# Patient Record
Sex: MALE | Race: WHITE | NOT HISPANIC OR LATINO | Employment: FULL TIME | URBAN - METROPOLITAN AREA
[De-identification: names, ages, dates, MRNs, and addresses within clinical notes are randomized per-mention and may not be internally consistent; named-entity substitution may affect disease eponyms.]

---

## 2019-06-01 ENCOUNTER — HOSPITAL ENCOUNTER (EMERGENCY)
Facility: HOSPITAL | Age: 59
Discharge: HOME | End: 2019-06-01
Attending: EMERGENCY MEDICINE
Payer: COMMERCIAL

## 2019-06-01 VITALS
SYSTOLIC BLOOD PRESSURE: 111 MMHG | HEIGHT: 74 IN | OXYGEN SATURATION: 94 % | DIASTOLIC BLOOD PRESSURE: 74 MMHG | TEMPERATURE: 98 F | HEART RATE: 73 BPM | RESPIRATION RATE: 25 BRPM | WEIGHT: 247 LBS | BODY MASS INDEX: 31.7 KG/M2

## 2019-06-01 DIAGNOSIS — R06.2 WHEEZING: ICD-10-CM

## 2019-06-01 DIAGNOSIS — R06.02 SOB (SHORTNESS OF BREATH): Primary | ICD-10-CM

## 2019-06-01 PROBLEM — J44.89 BRONCHITIS WITH AIRWAY OBSTRUCTION: Status: ACTIVE | Noted: 2019-06-01

## 2019-06-01 LAB
ALBUMIN SERPL BCP-MCNC: 3.5 G/DL (ref 3.5–5.2)
ALP SERPL-CCNC: 64 U/L (ref 55–135)
ALT SERPL W/O P-5'-P-CCNC: 34 U/L (ref 10–44)
ANION GAP SERPL CALC-SCNC: 9 MMOL/L (ref 8–16)
AST SERPL-CCNC: 22 U/L (ref 10–40)
BASOPHILS # BLD AUTO: 0.01 K/UL (ref 0–0.2)
BASOPHILS NFR BLD: 0.2 % (ref 0–1.9)
BILIRUB SERPL-MCNC: 0.6 MG/DL (ref 0.1–1)
BNP SERPL-MCNC: 46 PG/ML (ref 0–99)
BUN SERPL-MCNC: 12 MG/DL (ref 6–20)
CALCIUM SERPL-MCNC: 9.5 MG/DL (ref 8.7–10.5)
CHLORIDE SERPL-SCNC: 109 MMOL/L (ref 95–110)
CO2 SERPL-SCNC: 22 MMOL/L (ref 23–29)
CREAT SERPL-MCNC: 0.8 MG/DL (ref 0.5–1.4)
DIFFERENTIAL METHOD: ABNORMAL
EOSINOPHIL # BLD AUTO: 0.1 K/UL (ref 0–0.5)
EOSINOPHIL NFR BLD: 1.3 % (ref 0–8)
ERYTHROCYTE [DISTWIDTH] IN BLOOD BY AUTOMATED COUNT: 12.1 % (ref 11.5–14.5)
EST. GFR  (AFRICAN AMERICAN): >60 ML/MIN/1.73 M^2
EST. GFR  (NON AFRICAN AMERICAN): >60 ML/MIN/1.73 M^2
GLUCOSE SERPL-MCNC: 96 MG/DL (ref 70–110)
HCT VFR BLD AUTO: 40.9 % (ref 40–54)
HGB BLD-MCNC: 14.3 G/DL (ref 14–18)
LYMPHOCYTES # BLD AUTO: 1.7 K/UL (ref 1–4.8)
LYMPHOCYTES NFR BLD: 26.2 % (ref 18–48)
MCH RBC QN AUTO: 32.5 PG (ref 27–31)
MCHC RBC AUTO-ENTMCNC: 35 G/DL (ref 32–36)
MCV RBC AUTO: 93 FL (ref 82–98)
MONOCYTES # BLD AUTO: 0.4 K/UL (ref 0.3–1)
MONOCYTES NFR BLD: 6.8 % (ref 4–15)
NEUTROPHILS # BLD AUTO: 4.2 K/UL (ref 1.8–7.7)
NEUTROPHILS NFR BLD: 65.5 % (ref 38–73)
PLATELET # BLD AUTO: 186 K/UL (ref 150–350)
PMV BLD AUTO: 9 FL (ref 9.2–12.9)
POTASSIUM SERPL-SCNC: 3.8 MMOL/L (ref 3.5–5.1)
PROT SERPL-MCNC: 6.4 G/DL (ref 6–8.4)
RBC # BLD AUTO: 4.4 M/UL (ref 4.6–6.2)
SODIUM SERPL-SCNC: 140 MMOL/L (ref 136–145)
WBC # BLD AUTO: 6.33 K/UL (ref 3.9–12.7)

## 2019-06-01 PROCEDURE — 96374 THER/PROPH/DIAG INJ IV PUSH: CPT

## 2019-06-01 PROCEDURE — 80053 COMPREHEN METABOLIC PANEL: CPT

## 2019-06-01 PROCEDURE — 94640 AIRWAY INHALATION TREATMENT: CPT

## 2019-06-01 PROCEDURE — 94664 DEMO&/EVAL PT USE INHALER: CPT

## 2019-06-01 PROCEDURE — 63600175 PHARM REV CODE 636 W HCPCS: Performed by: EMERGENCY MEDICINE

## 2019-06-01 PROCEDURE — 99284 EMERGENCY DEPT VISIT MOD MDM: CPT

## 2019-06-01 PROCEDURE — 99900035 HC TECH TIME PER 15 MIN (STAT)

## 2019-06-01 PROCEDURE — 85025 COMPLETE CBC W/AUTO DIFF WBC: CPT

## 2019-06-01 PROCEDURE — 83880 ASSAY OF NATRIURETIC PEPTIDE: CPT

## 2019-06-01 PROCEDURE — 25000242 PHARM REV CODE 250 ALT 637 W/ HCPCS: Performed by: EMERGENCY MEDICINE

## 2019-06-01 PROCEDURE — 27000646 HC AEROBIKA DEVICE

## 2019-06-01 RX ORDER — IPRATROPIUM BROMIDE 0.5 MG/2.5ML
500 SOLUTION RESPIRATORY (INHALATION) 4 TIMES DAILY
COMMUNITY

## 2019-06-01 RX ORDER — IPRATROPIUM BROMIDE AND ALBUTEROL SULFATE 2.5; .5 MG/3ML; MG/3ML
3 SOLUTION RESPIRATORY (INHALATION)
Status: COMPLETED | OUTPATIENT
Start: 2019-06-01 | End: 2019-06-01

## 2019-06-01 RX ORDER — BUDESONIDE AND FORMOTEROL FUMARATE DIHYDRATE 80; 4.5 UG/1; UG/1
2 AEROSOL RESPIRATORY (INHALATION) 2 TIMES DAILY
Qty: 1 INHALER | Refills: 3 | OUTPATIENT
Start: 2019-06-01 | End: 2020-05-31

## 2019-06-01 RX ORDER — METHYLPREDNISOLONE SOD SUCC 125 MG
125 VIAL (EA) INJECTION
Status: COMPLETED | OUTPATIENT
Start: 2019-06-01 | End: 2019-06-01

## 2019-06-01 RX ORDER — FLUTICASONE PROPIONATE 110 UG/1
1 AEROSOL, METERED RESPIRATORY (INHALATION) 2 TIMES DAILY
COMMUNITY

## 2019-06-01 RX ORDER — ALBUTEROL SULFATE 90 UG/1
2 AEROSOL, METERED RESPIRATORY (INHALATION) EVERY 6 HOURS PRN
Qty: 18 G | Refills: 0 | OUTPATIENT
Start: 2019-06-01 | End: 2020-05-31

## 2019-06-01 RX ORDER — TIOTROPIUM BROMIDE 18 UG/1
18 CAPSULE ORAL; RESPIRATORY (INHALATION) DAILY
COMMUNITY

## 2019-06-01 RX ORDER — FLUTICASONE PROPIONATE 50 MCG
1 SPRAY, SUSPENSION (ML) NASAL DAILY
COMMUNITY

## 2019-06-01 RX ORDER — ALBUTEROL SULFATE 90 UG/1
1-2 AEROSOL, METERED RESPIRATORY (INHALATION) EVERY 6 HOURS PRN
Qty: 1 INHALER | Refills: 0 | Status: SHIPPED | OUTPATIENT
Start: 2019-06-01 | End: 2021-02-05

## 2019-06-01 RX ADMIN — METHYLPREDNISOLONE SODIUM SUCCINATE 125 MG: 125 INJECTION, POWDER, FOR SOLUTION INTRAMUSCULAR; INTRAVENOUS at 11:06

## 2019-06-01 RX ADMIN — IPRATROPIUM BROMIDE AND ALBUTEROL SULFATE 3 ML: .5; 3 SOLUTION RESPIRATORY (INHALATION) at 11:06

## 2019-06-01 NOTE — HPI
Mr. Mcleod is a 59yo WM w/ no pmhx recently evaluated in [facility] for cough that has now been present for approx 2.5 months. He was evaluated twice over the last month. Initially he had complained of viral URI symptoms prior to developing the cough. He has no hx of asthma as a child. He notes never smoking. He has been taking his inhalers as directed. He was tested for pertussis but results are c/w prior infection or immunization. This morning however he became progressively more dyspneic. He called his pulmonologist and was instructed to present to the ED. He notes mild chest pain and soreness. Denies hemoptysis.

## 2019-06-01 NOTE — SUBJECTIVE & OBJECTIVE
History reviewed. No pertinent past medical history.    Past Surgical History:   Procedure Laterality Date    CERVICAL FUSION      left leg surgery         Review of patient's allergies indicates:  No Known Allergies    Family History     None        Tobacco Use    Smoking status: Never Smoker    Smokeless tobacco: Former User   Substance and Sexual Activity    Alcohol use: Yes     Comment: occasional    Drug use: Never    Sexual activity: Not on file         Review of Systems   All other systems reviewed and are negative.    Objective:     Vital Signs (Most Recent):  Temp: 98 °F (36.7 °C) (06/01/19 1305)  Pulse: 73 (06/01/19 1259)  Resp: (!) 25 (06/01/19 1259)  BP: 111/74 (06/01/19 1305)  SpO2: (!) 94 % (06/01/19 1259) Vital Signs (24h Range):  Temp:  [97.1 °F (36.2 °C)-98 °F (36.7 °C)] 98 °F (36.7 °C)  Pulse:  [61-73] 73  Resp:  [18-25] 25  SpO2:  [92 %-99 %] 94 %  BP: (111-131)/(60-78) 111/74     Weight: 112 kg (247 lb)  Body mass index is 31.71 kg/m².    No intake or output data in the 24 hours ending 06/01/19 1519    Physical Exam   Constitutional: He is oriented to person, place, and time. He appears well-developed and well-nourished.   HENT:   Head: Normocephalic and atraumatic.   Eyes: Pupils are equal, round, and reactive to light. EOM are normal.   Cardiovascular: Normal rate, regular rhythm and normal heart sounds.   Pulmonary/Chest: Effort normal. He has no rales.   Harsh, expiratory wheezes   Abdominal: Soft. Bowel sounds are normal.   Neurological: He is alert and oriented to person, place, and time.   Skin: Skin is warm and dry. No erythema.       Vents:       Lines/Drains/Airways          None          Significant Labs:    CBC/Anemia Profile:  Recent Labs   Lab 06/01/19  1103   WBC 6.33   HGB 14.3   HCT 40.9      MCV 93   RDW 12.1        Chemistries:  Recent Labs   Lab 06/01/19  1103      K 3.8      CO2 22*   BUN 12   CREATININE 0.8   CALCIUM 9.5   ALBUMIN 3.5   PROT 6.4    BILITOT 0.6   ALKPHOS 64   ALT 34   AST 22       All pertinent labs within the past 24 hours have been reviewed.    Significant Imaging:   CT w/ linear consolidations concerning for mucous plugging

## 2019-06-01 NOTE — ASSESSMENT & PLAN NOTE
-recent w/u for cough over the last 2.5 months; prior viral URI; now with worsening cough and dyspnea; instructed to present to the ED by primary pulmonologist  -CT w/ linear consolidations concerning for mucous plugging  -This likely represents some mucous plugging from recent acute bronchitis. He will be given an acapella valve and instructed on CPT. He will continue his LABA/ICS and MERY and short acting antimuscarinic. If he continues to not improve, he would likely need a bronch w/ BAL.

## 2019-06-01 NOTE — CONSULTS
[facility]  Pulmonology  Consult Note    Patient Name: Mauricio Mcleod  MRN: [id number]  Admission Date: 6/1/2019  Hospital Length of Stay: 0 days  Code Status: No Order  Attending Physician: No att. providers found  Primary Care Provider: Primary Doctor No   Principal Problem: <principal problem not specified>    Consults  Subjective:     HPI:  Mr. Mcleod is a 59yo WM w/ no pmhx recently evaluated in [facility] for cough that has now been present for approx 2.5 months. He was evaluated twice over the last month. Initially he had complained of viral URI symptoms prior to developing the cough. He has no hx of asthma as a child. He notes never smoking. He has been taking his inhalers as directed. He was tested for pertussis but results are c/w prior infection or immunization. This morning however he became progressively more dyspneic. He called his pulmonologist and was instructed to present to the ED. He notes mild chest pain and soreness. Denies hemoptysis.     History reviewed. No pertinent past medical history.    Past Surgical History:   Procedure Laterality Date    CERVICAL FUSION      left leg surgery         Review of patient's allergies indicates:  No Known Allergies    Family History     None        Tobacco Use    Smoking status: Never Smoker    Smokeless tobacco: Former User   Substance and Sexual Activity    Alcohol use: Yes     Comment: occasional    Drug use: Never    Sexual activity: Not on file         Review of Systems   All other systems reviewed and are negative.    Objective:     Vital Signs (Most Recent):  Temp: 98 °F (36.7 °C) (06/01/19 1305)  Pulse: 73 (06/01/19 1259)  Resp: (!) 25 (06/01/19 1259)  BP: 111/74 (06/01/19 1305)  SpO2: (!) 94 % (06/01/19 1259) Vital Signs (24h Range):  Temp:  [97.1 °F (36.2 °C)-98 °F (36.7 °C)] 98 °F (36.7 °C)  Pulse:  [61-73] 73  Resp:  [18-25] 25  SpO2:  [92 %-99 %] 94 %  BP: (111-131)/(60-78) 111/74     Weight: 112 kg (247 lb)  Body mass  index is 31.71 kg/m².    No intake or output data in the 24 hours ending 06/01/19 1519    Physical Exam   Constitutional: He is oriented to person, place, and time. He appears well-developed and well-nourished.   HENT:   Head: Normocephalic and atraumatic.   Eyes: Pupils are equal, round, and reactive to light. EOM are normal.   Cardiovascular: Normal rate, regular rhythm and normal heart sounds.   Pulmonary/Chest: Effort normal. He has no rales.   Harsh, expiratory wheezes   Abdominal: Soft. Bowel sounds are normal.   Neurological: He is alert and oriented to person, place, and time.   Skin: Skin is warm and dry. No erythema.       Vents:       Lines/Drains/Airways          None          Significant Labs:    CBC/Anemia Profile:  Recent Labs   Lab 06/01/19  1103   WBC 6.33   HGB 14.3   HCT 40.9      MCV 93   RDW 12.1        Chemistries:  Recent Labs   Lab 06/01/19  1103      K 3.8      CO2 22*   BUN 12   CREATININE 0.8   CALCIUM 9.5   ALBUMIN 3.5   PROT 6.4   BILITOT 0.6   ALKPHOS 64   ALT 34   AST 22       All pertinent labs within the past 24 hours have been reviewed.    Significant Imaging:   CT w/ linear consolidations concerning for mucous plugging    Assessment/Plan:     Bronchitis with airway obstruction  -recent w/u for cough over the last 2.5 months; prior viral URI; now with worsening cough and dyspnea; instructed to present to the ED by primary pulmonologist  -CT w/ linear consolidations concerning for mucous plugging  -This likely represents some mucous plugging from recent acute bronchitis. He will be given an acapella valve and instructed on CPT. He will continue his LABA/ICS and MERY and short acting antimuscarinic. If he continues to not improve, he would likely need a bronch w/ BAL.               Thank you for your consult. I will sign off. Please contact us if you have any additional questions.     David Hernandez MD  Pulmonology  [facility]

## 2019-06-01 NOTE — ED PROVIDER NOTES
Encounter Date: 6/1/2019    SCRIBE #1 NOTE: I, Nadya Caceresz, am scribing for, and in the presence of,  Dr. Lawson. I have scribed the entire note.     I, Dr. Grecia Lawson MD, personally performed the services described in this documentation. All medical record entries made by the scribe were at my direction and in my presence.  I have reviewed the chart and agree that the record reflects my personal performance and is accurate and complete. Grecia Lawson MD.    History     Chief Complaint   Patient presents with    Shortness of Breath     Pt saw Pulmonologist last Friday for cough, put on Spiriva, Flovent, Atrovent and Flonase. Today pt having shortness of breath and coughing up green phlegm. Afebrile.      CHIEF COMPLAINT: Patient presents with: productive cough      HISTORY OF PRESENT ILLNESS: Mauricio Mcleod who is a 58 y.o. presents to the emergency department today with complaint of a productive cough that began 3 months ago. Initially, his cough produce white, pearly sputum, and it has now progressed to green sputum. Associated symptoms include SOB and CP when he coughs. He denies any recent long distance travel, leg swelling, hemoptysis, fever, chills, or sweats. He saw a pulmonologist at [facility] .      ALLERGIES REVIEWED  MEDICATIONS REVIEWED  PMH/PSH/SOC/FH REVIEWED     The history is provided by the patient.    Nursing/Ancillary staff note reviewed.    The history is provided by the patient.     Review of patient's allergies indicates:  No Known Allergies  History reviewed. No pertinent past medical history.  Past Surgical History:   Procedure Laterality Date    CERVICAL FUSION      left leg surgery       History reviewed. No pertinent family history.  Social History     Tobacco Use    Smoking status: Never Smoker    Smokeless tobacco: Former User   Substance Use Topics    Alcohol use: Yes     Comment: occasional    Drug use: Never     Review of Systems   Constitutional: Negative for activity change,  chills, diaphoresis and fever.   HENT: Negative for congestion, drooling, ear pain, rhinorrhea, sneezing, sore throat and trouble swallowing.    Eyes: Negative for pain.   Respiratory: Positive for cough and shortness of breath. Negative for chest tightness, wheezing and stridor.    Cardiovascular: Positive for chest pain. Negative for palpitations and leg swelling.        CP with cough   Gastrointestinal: Negative for abdominal distention, abdominal pain, constipation, diarrhea, nausea and vomiting.   Genitourinary: Negative for difficulty urinating, dysuria, frequency and urgency.   Musculoskeletal: Negative for arthralgias, back pain, myalgias, neck pain and neck stiffness.   Skin: Negative for pallor, rash and wound.   Neurological: Negative for dizziness, syncope, weakness, light-headedness, numbness and headaches.   All other systems reviewed and are negative.      Physical Exam     Initial Vitals [06/01/19 1031]   BP Pulse Resp Temp SpO2   131/78 62 (!) 22 97.1 °F (36.2 °C) 95 %      MAP       --         Physical Exam    Nursing note and vitals reviewed.  Constitutional: He appears well-developed and well-nourished. He is not diaphoretic. No distress.   HENT:   Head: Normocephalic and atraumatic.   Nose: Nose normal.   Mouth/Throat: Oropharynx is clear and moist.   Eyes: Conjunctivae and EOM are normal. Pupils are equal, round, and reactive to light. No scleral icterus.   Neck: Normal range of motion. Neck supple. No JVD present.   Cardiovascular: Normal rate, regular rhythm and normal heart sounds. Exam reveals no gallop and no friction rub.    No murmur heard.  Pulmonary/Chest: No stridor. No respiratory distress. He has no wheezes. He exhibits no tenderness.   Significant wheezing throughout all lung zones   Abdominal: Soft. Bowel sounds are normal. He exhibits no distension and no mass. There is no tenderness. There is no rebound and no guarding.   Musculoskeletal: Normal range of motion. He exhibits no  edema or tenderness.        Cervical back: Normal.        Thoracic back: Normal.        Lumbar back: Normal.   Lymphadenopathy:     He has no cervical adenopathy.   Neurological: He is alert and oriented to person, place, and time. He has normal strength. No cranial nerve deficit.   Skin: Skin is warm and dry. No rash noted. No pallor.   Psychiatric: He has a normal mood and affect. Thought content normal.         ED Course   Procedures  Labs Reviewed   CBC W/ AUTO DIFFERENTIAL - Abnormal; Notable for the following components:       Result Value    RBC 4.40 (*)     Mean Corpuscular Hemoglobin 32.5 (*)     MPV 9.0 (*)     All other components within normal limits   COMPREHENSIVE METABOLIC PANEL - Abnormal; Notable for the following components:    CO2 22 (*)     All other components within normal limits   B-TYPE NATRIURETIC PEPTIDE            X-Rays:   Independently Interpreted Readings:   Other Readings:  Reviewed by myself, read by radiology.       Imaging Results          CT Chest Without Contrast (Final result)  Result time 06/01/19 12:09:38    Final result by Tommie Pollard MD (06/01/19 12:09:38)                 Impression:      Small amount of opacity in the bronchi to the right lower and right middle lobes with a small amount of adjacent opacity.  Findings may be seen with aspiration or bronchitis.    Mild diffuse form dilatation of the ascending thoracic aorta.      Electronically signed by: Tommie Pollard MD  Date:    06/01/2019  Time:    12:09             Narrative:    EXAMINATION:  CT CHEST WITHOUT CONTRAST    CLINICAL HISTORY:  Shortness of breath;    TECHNIQUE:  Low dose axial images, sagittal and coronal reformations were obtained from the thoracic inlet to the lung bases. Contrast was not administered.    COMPARISON:  None.    FINDINGS:  Normal thyroid.  Thoracic aorta demonstrates mild dilatation of the ascending thoracic aorta measuring 4.2 cm.  No cardiomegaly.  Mild calcific coronary artery  atherosclerosis.  No pericardial effusion.  Mild bilateral gynecomastia.    No evidence of mediastinal, hilar, or axillary lymphadenopathy.    Trachea is patent.  There is a small amount of secretions in opacities in the bronchitis to the right lower lobe which may be seen with aspiration or bronchitis.  There is a small amount of adjacent opacity in the right lower and right middle lobes.  Remaining portion of the lungs are clear.    Limited evaluation of the upper abdomen demonstrates subcentimeter hypodensity in the right hepatic lobe, too small to characterize but likely representing a cyst.    Osseous structures demonstrate no suspicious osseous lesions.  Postsurgical changes of the cervical spine.                                Medical Decision Making:   History:   Old Medical Records: I decided to obtain old medical records.  Initial Assessment:   Patient presents with a productive cough and associated SOB. Will obtain CT of chest.  Differential Diagnosis:   Pulmonary infectious process, COPD, asthma, pulmonary embolus and congestive heart failure.    Clinical Tests:   Radiological Study: Ordered and Reviewed  ED Management:  The pt presents to the ED today with cough and sputum production that has been ongoing. His CT scan shows likely mucus plugging. Pulmonology has been to bedside and have discussed the results and treatment plan with the pt. No need for admission at this time. I have discussed with him the results of the CT scan that will need followup - thoracic aorta has some dilation which will need to be monitored by his PCP. He understands. He will follow up with his PCP and pulmonology. After taking into careful account the historical factors and physical exam findings of the patient's presentation today, in conjunction with the empirical and objective data obtained on ED workup, no acute emergent medical condition requiring admission has been identified. The patient appears to be low risk for an  emergent medical condition and I feel it is safe and appropriate at this time for the patient to be discharged to follow-up as detailed in their discharge instructions for reevaluation and possible continued outpatient workup and management. I have discussed the specifics of the workup with the patient and the patient has verbalized understanding of the details of the workup, the diagnosis, the treatment plan, and the need for outpatient follow-up.  Although the patient has no emergent etiology today this does not preclude the development of an emergent condition so in addition, I have advised the patient that they can return to the ED and/or activate EMS at any time with worsening of their symptoms, change of their symptoms, or with any other medical complaint.  The patient remained comfortable and stable during their visit in the ED.  Discharge and follow-up instructions discussed with the patient who expressed understanding and willingness to comply with my recommendations.     This medical record was prepared using voice dictation software. There may be phonetic errors.                       ED Course as of Jun 02 0717   Sat Jun 01, 2019   1239 Dr. Leyva has been to bedside and discussed results of the workup with the pt and treatment plan. At this time Dr. Leyva advised that he is suitable for discharge and follow up in clinic. We have discussed the Thoracic aorta demonstrates mild dilatation of the ascending thoracic aorta measuring 4.2 cm findings on the CT scan and he will follow up with PCP for monitoring.       [SG]      ED Course User Index  [SG] Nadya Orona     Clinical Impression:       ICD-10-CM ICD-9-CM   1. SOB (shortness of breath) R06.02 786.05   2. Wheezing R06.2 786.07                                Grecia Lawson MD  06/02/19 0765

## 2019-06-01 NOTE — DISCHARGE INSTRUCTIONS
Additional instructions  Followup with your pulmonologist as scheduled. Take all your medications as prescribed. Return to the emergency department if you have increasing pain, chest pain, difficulty breathing,  nonstop vomiting, or any other concerns. Be sure to drink plenty of fluids to stay hydrated. Get plenty of rest. Please refer to additional educational material for further instructions.

## 2019-06-05 DIAGNOSIS — R91.8 ABNORMAL CT SCAN OF LUNG: Primary | ICD-10-CM

## 2019-06-07 ENCOUNTER — TELEPHONE (OUTPATIENT)
Dept: PULMONOLOGY | Facility: HOSPITAL | Age: 59
End: 2019-06-07

## 2019-06-07 NOTE — TELEPHONE ENCOUNTER
Spoke with Mr. Mauricio Mcleod over the telephone, in regards to his upcoming bronchosocpy this Monday, 6/10/19.  I instructed him to report to the pre bronchoscopy registration area at 7 am and to eat nothing after dinner this Sunday.    Florentino Fry MD, MSc  Pulmonary/Critical Care Fellow

## 2019-06-10 ENCOUNTER — HOSPITAL ENCOUNTER (OUTPATIENT)
Dept: PULMONOLOGY | Facility: HOSPITAL | Age: 59
Discharge: HOME | End: 2019-06-10
Attending: INTERNAL MEDICINE
Payer: COMMERCIAL

## 2019-06-10 ENCOUNTER — HOSPITAL ENCOUNTER (OUTPATIENT)
Facility: HOSPITAL | Age: 59
Discharge: HOME | End: 2019-06-10
Attending: INTERNAL MEDICINE | Admitting: INTERNAL MEDICINE
Payer: COMMERCIAL

## 2019-06-10 VITALS
WEIGHT: 248 LBS | HEIGHT: 74 IN | RESPIRATION RATE: 18 BRPM | SYSTOLIC BLOOD PRESSURE: 119 MMHG | OXYGEN SATURATION: 94 % | DIASTOLIC BLOOD PRESSURE: 79 MMHG | HEART RATE: 72 BPM | TEMPERATURE: 98 F | BODY MASS INDEX: 31.83 KG/M2

## 2019-06-10 DIAGNOSIS — R05.3 CHRONIC COUGH: ICD-10-CM

## 2019-06-10 DIAGNOSIS — R91.8 ABNORMAL CT SCAN OF LUNG: ICD-10-CM

## 2019-06-10 DIAGNOSIS — R93.89 ABNORMAL CT SCAN, CHEST: ICD-10-CM

## 2019-06-10 LAB
ACID FAST MOD KINY STN SPEC: NORMAL
APPEARANCE FLD: CLEAR
BASOPHILS NFR FLD MANUAL: 0 %
BODY FLD TYPE: NORMAL
BODY FLUID COMMENTS: NORMAL
COLOR FLD: COLORLESS
EOSINOPHIL NFR FLD MANUAL: 0 %
KOH PREP SPEC: NORMAL
LYMPHOCYTES NFR FLD MANUAL: 41 %
MESOTHL CELL NFR FLD MANUAL: 0 %
MONOS+MACROS NFR FLD MANUAL: 37 %
NEUTROPHILS NFR FLD MANUAL: 22 %
OTHER CELLS FLD MANUAL: 0 %
WBC # FLD: 53 /CU MM

## 2019-06-10 PROCEDURE — 88112 CYTOLOGY SPECIMEN- MEDICAL CYTOLOGY (FLUID/WASH/BRUSH): ICD-10-PCS | Mod: 26,,, | Performed by: PATHOLOGY

## 2019-06-10 PROCEDURE — 88305 TISSUE EXAM BY PATHOLOGIST: CPT | Performed by: PATHOLOGY

## 2019-06-10 PROCEDURE — 31622 DX BRONCHOSCOPE/WASH: CPT

## 2019-06-10 PROCEDURE — 63600175 PHARM REV CODE 636 W HCPCS: Performed by: STUDENT IN AN ORGANIZED HEALTH CARE EDUCATION/TRAINING PROGRAM

## 2019-06-10 PROCEDURE — 87205 SMEAR GRAM STAIN: CPT

## 2019-06-10 PROCEDURE — 31625 BRONCHOSCOPY W/BIOPSY(S): CPT

## 2019-06-10 PROCEDURE — 87070 CULTURE OTHR SPECIMN AEROBIC: CPT

## 2019-06-10 PROCEDURE — 87210 SMEAR WET MOUNT SALINE/INK: CPT

## 2019-06-10 PROCEDURE — 88305 TISSUE EXAM BY PATHOLOGIST: CPT | Mod: 26,,, | Performed by: PATHOLOGY

## 2019-06-10 PROCEDURE — 88112 CYTOPATH CELL ENHANCE TECH: CPT | Mod: 26,,, | Performed by: PATHOLOGY

## 2019-06-10 PROCEDURE — 25000003 PHARM REV CODE 250: Performed by: STUDENT IN AN ORGANIZED HEALTH CARE EDUCATION/TRAINING PROGRAM

## 2019-06-10 PROCEDURE — 88305 TISSUE SPECIMEN TO PATHOLOGY: ICD-10-PCS | Mod: 26,,, | Performed by: PATHOLOGY

## 2019-06-10 PROCEDURE — 31624 DX BRONCHOSCOPE/LAVAGE: CPT

## 2019-06-10 PROCEDURE — 87102 FUNGUS ISOLATION CULTURE: CPT

## 2019-06-10 PROCEDURE — 87632 RESP VIRUS 6-11 TARGETS: CPT

## 2019-06-10 PROCEDURE — 87206 SMEAR FLUORESCENT/ACID STAI: CPT

## 2019-06-10 PROCEDURE — 87206 SMEAR FLUORESCENT/ACID STAI: CPT | Mod: 91

## 2019-06-10 PROCEDURE — 89051 BODY FLUID CELL COUNT: CPT

## 2019-06-10 PROCEDURE — 31628 BRONCHOSCOPY/LUNG BX EACH: CPT

## 2019-06-10 PROCEDURE — 87116 MYCOBACTERIA CULTURE: CPT

## 2019-06-10 PROCEDURE — 87015 SPECIMEN INFECT AGNT CONCNTJ: CPT

## 2019-06-10 PROCEDURE — 88112 CYTOPATH CELL ENHANCE TECH: CPT | Performed by: PATHOLOGY

## 2019-06-10 RX ORDER — FENTANYL CITRATE 50 UG/ML
50 INJECTION, SOLUTION INTRAMUSCULAR; INTRAVENOUS ONCE
Status: DISCONTINUED | OUTPATIENT
Start: 2019-06-10 | End: 2019-06-10 | Stop reason: HOSPADM

## 2019-06-10 RX ORDER — LIDOCAINE HYDROCHLORIDE 10 MG/ML
20 INJECTION INFILTRATION; PERINEURAL ONCE
Status: COMPLETED | OUTPATIENT
Start: 2019-06-10 | End: 2019-06-10

## 2019-06-10 RX ORDER — MIDAZOLAM HYDROCHLORIDE 5 MG/ML
4 INJECTION INTRAMUSCULAR; INTRAVENOUS ONCE
Status: COMPLETED | OUTPATIENT
Start: 2019-06-10 | End: 2019-06-10

## 2019-06-10 RX ORDER — FENTANYL CITRATE 50 UG/ML
50 INJECTION, SOLUTION INTRAMUSCULAR; INTRAVENOUS ONCE
Status: COMPLETED | OUTPATIENT
Start: 2019-06-10 | End: 2019-06-10

## 2019-06-10 RX ORDER — LIDOCAINE HYDROCHLORIDE 20 MG/ML
10 SOLUTION OROPHARYNGEAL ONCE
Status: COMPLETED | OUTPATIENT
Start: 2019-06-10 | End: 2019-06-10

## 2019-06-10 RX ORDER — LIDOCAINE HYDROCHLORIDE 20 MG/ML
10 INJECTION, SOLUTION EPIDURAL; INFILTRATION; INTRACAUDAL; PERINEURAL ONCE
Status: DISCONTINUED | OUTPATIENT
Start: 2019-06-10 | End: 2019-06-10 | Stop reason: HOSPADM

## 2019-06-10 RX ADMIN — LIDOCAINE HYDROCHLORIDE 20 ML: 10 INJECTION, SOLUTION INFILTRATION; PERINEURAL at 09:06

## 2019-06-10 RX ADMIN — MIDAZOLAM HYDROCHLORIDE 4 MG: 5 INJECTION, SOLUTION INTRAMUSCULAR; INTRAVENOUS at 09:06

## 2019-06-10 RX ADMIN — LIDOCAINE HYDROCHLORIDE 10 ML: 20 SOLUTION ORAL; TOPICAL at 09:06

## 2019-06-10 RX ADMIN — FENTANYL CITRATE 50 MCG: 50 INJECTION, SOLUTION INTRAMUSCULAR; INTRAVENOUS at 08:06

## 2019-06-10 NOTE — HPI
59 y/o male (never smoker) here for an outpatient bronchoscopy for a chronic cough (5 months) and abnormal chest imaging (patient of Dr. Jonathan Villafana at [facility]).  He has been NPO since midnight and is not on any antiplatelets/anticoagulation.    PSx: ACDF    Allergies: NKDA    ROS: cough    Social History: social drinker, never smoker.  No illicit drug use    Physical Examination:    Gen: AAOx3, NAD  Head: NC/AT  Eyes: PERRLA, EOMI  Ears: no discharge  Nose: no nasal polyps  Throat: no tonsillar exudate  Neck: no masses  Lungs: CTAB, no wheezing, rhonci or rales  Cardiac: S1/S2=normal, no murmurs, rubs or gallops  Abdomen: soft, nontender, nondistended, BS+  Extremities: no edema  Skin: no rashes    ASA: 2 Mallampati: 2    Florentino Fry MD, MSc  Pulmonary/Critical Care Fellow

## 2019-06-10 NOTE — PROCEDURES
Bronchoscopy Procedure Note      Date of Operation: 6/10/19    Operators: Florentino Fry MD  (fellow) & Erickson Magana  (staff)    Anesthesia:   Versed 4mg (in divided doses)  Fentanyl 50mcg (in divided doses)  2% lidocaine jelly  1% lidocaine (infiltrated onto cords and into airways during procedure)    Operation: Flexible fiberoptic bronchoscopy:bronchial wash/endobronchial mucosa biopsies    Specimen: BAL of RML and RLL (superior segment) combined: total of 50 cc returned and sent for further analysis (180 cc instilled)     Estimated Blood Loss: Minimal    Consent:   The risks, benefits, complications, treatment options and expected outcomes were discussed with the patient.  The possibilities of reaction to medication, pulmonary aspiration, pneumothorax, bleeding, failure to diagnose her condition, and creating a complication requiring transfusion or operation were discussed with the patient who freely signed the consent.      Description of Procedure:    After the induction of topical nasopharyngeal anesthesia, the patient was positioned supine, and the bronchoscope was passed through the left nare without difficulty. The vocal cords were visualized and  2% buffered lidocaine 2 ml was topically placed onto the cords. The cords were unremarkable in appearance with normal motion. The scope was then passed into the trachea.  1% buffered lidocaine 1 ml was used topically on the ermias.  Careful inspection of the tracheal lumen was accomplished. The scope was sequentially passed into the right main bronchus and then into the RUL, RML, and RLL bronchi and segmental bronchi.  Overall mucosa appeared erythematous without any active bleeding or evidence of endobronchial lesions; however secretions evident in the RLL.  BAL of RML and RLL (superior segement) performed.  Endobronchial mucosa biopsies obtained at the ermias and secondary ermias/junction of the RML.  The scope was then subsequently passed into the  left main bronchus, MARCELLO, and LLL bronchi and segmental bronchi: no evidence of secretions, endobronchial lesions, secretions or bleeding.    The scope was then withdrawn, and the patient was taken to the recovery area in satisfactory condition.    Florentino Fry MD, MSc  Pulmonary/Critical Care Fellow    Pt seen and examined with Pulmonary/Critical Care team and this note reviewed and validated with the following additional comments:    Supervised by me.  Mucosa bronchitic appearing. Tolerated well.    Jonathan Magana MD  Phone [phone]

## 2019-06-10 NOTE — SUBJECTIVE & OBJECTIVE
History reviewed. No pertinent past medical history.    Past Surgical History:   Procedure Laterality Date    CERVICAL FUSION      left leg surgery         Review of patient's allergies indicates:  No Known Allergies     Family History     None        Tobacco Use    Smoking status: Never Smoker    Smokeless tobacco: Former User   Substance and Sexual Activity    Alcohol use: Never     Frequency: Never     Comment: occasional    Drug use: Never    Sexual activity: Not on file         Review of Systems  Objective:     Vital Signs (Most Recent):    Vital Signs (24h Range):  BP: ()/()   Arterial Line BP: ()/()         There is no height or weight on file to calculate BMI.    No intake or output data in the 24 hours ending 06/10/19 0750    Physical Exam    Vents:       Lines/Drains/Airways          None          Significant Labs:    CBC/Anemia Profile:  No results for input(s): WBC, HGB, HCT, PLT, MCV, RDW, IRON, FERRITIN, RETIC, FOLATE, [id number], OCCULTBLOOD in the last 48 hours.     Chemistries:  No results for input(s): NA, K, CL, CO2, BUN, CREATININE, CALCIUM, ALBUMIN, PROT, BILITOT, ALKPHOS, ALT, AST, GLUCOSE, MG, PHOS in the last 48 hours.

## 2019-06-10 NOTE — H&P
[facility]  Pulmonology  H&P    Patient Name: Mauricio Mcleod  MRN: [id number]  Admission Date: 6/10/2019  Code Status: Full Code  Primary Care Provider: Primary Doctor No   Principal Problem: <principal problem not specified>    HPI:       57 y/o male (never smoker) here for an outpatient bronchoscopy for a chronic cough (5 months) and abnormal chest imaging (patient of Dr. Jonathan Magaan's at [facility]).  He has been NPO since midnight and is not on any antiplatelets/anticoagulation.    PSx: ACDF    Allergies: NKDA    ROS: cough    Social History: social drinker, never smoker.  No illicit drug use    Physical Examination:    Gen: AAOx3, NAD  Head: NC/AT  Eyes: PERRLA, EOMI  Ears: no discharge  Nose: no nasal polyps  Throat: no tonsillar exudate  Neck: no masses  Lungs: CTAB, no wheezing, rhonci or rales  Cardiac: S1/S2=normal, no murmurs, rubs or gallops  Abdomen: soft, nontender, nondistended, BS+  Extremities: no edema  Skin: no rashes    ASA: 2 Mallampati: 2    Florentino Fry MD, MSc  Pulmonary/Critical Care Fellow    Pt seen and examined with Dr. Chang and this H&P reviewed and validated with the following additional comments:    Mucoid cough. Occasional wheeze. Otherwise appears healthy.    Jonathan Magana MD  Phone [phone]

## 2019-06-10 NOTE — DISCHARGE SUMMARY
Discharge Summary:    Date of Procedure: 6/10/19    Date of Discharge: 6/10/19    Bronchoscopy Course:  Tolerated bronchoscopy without any complications.  BAL of RML and RLL (superior segment) completed, as well as endobronchial mucosa biopsies at the ermias and secondary ermias/junction of the RML.  Patient will follow up with his pulmonologist, Dr. Magana at [facility], with results.     Florentino Fry MD, MSc  Pulmonary/Critical Care Fellow

## 2019-06-10 NOTE — PLAN OF CARE
Prepped for bronchoscopy, 2piv inserted. AA&OX3, VSS W/O S/S of distress nor cardiopulmonary instability. Denies pain, shortness of breath or N/V. S/W Dr. Fry regarding need for orders. Briefly arrived to bedside to start to place procedure orders but was called away to ICU for an emergency. Currently awaiting for orders.

## 2019-06-10 NOTE — PLAN OF CARE
Wife to bedside/ discharge instructions reviewed and pt and spouse verbalized good understanding. To car via w/c with staff for wife to drive pt home. Pt  Voiced no complaints. Stable, no distress. Resp even and nonlabored

## 2019-06-12 LAB
BACTERIA SPEC AEROBE CULT: NO GROWTH
GRAM STN SPEC: NORMAL
GRAM STN SPEC: NORMAL

## 2019-06-17 LAB
ENTEROVIRUS: NOT DETECTED
HUMAN BOCAVIRUS: NOT DETECTED
HUMAN CORONAVIRUS, COMMON COLD VIRUS: NOT DETECTED
INFLUENZA A - H1N1-09: NOT DETECTED
PARAINFLUENZA: NOT DETECTED
RVP - ADENOVIRUS: NOT DETECTED
RVP - HUMAN METAPNEUMOVIRUS (HMPV): NOT DETECTED
RVP - INFLUENZA A: NOT DETECTED
RVP - INFLUENZA B: NOT DETECTED
RVP - RESPIRATORY SYNCTIAL VIRUS (RSV) A: NOT DETECTED
RVP - RESPIRATORY VIRAL PANEL, SOURCE: NORMAL
RVP - RHINOVIRUS: NOT DETECTED

## 2019-07-09 LAB — FUNGUS SPEC CULT: NORMAL

## 2019-08-12 LAB
ACID FAST MOD KINY STN SPEC: NORMAL
MYCOBACTERIUM SPEC QL CULT: NORMAL

## 2021-02-05 ENCOUNTER — OFFICE VISIT (OUTPATIENT)
Dept: ALLERGY | Facility: CLINIC | Age: 61
End: 2021-02-05
Payer: COMMERCIAL

## 2021-02-05 ENCOUNTER — LAB VISIT (OUTPATIENT)
Dept: LAB | Facility: HOSPITAL | Age: 61
End: 2021-02-05
Attending: ALLERGY & IMMUNOLOGY
Payer: COMMERCIAL

## 2021-02-05 VITALS — BODY MASS INDEX: 33.67 KG/M2 | HEIGHT: 74 IN | WEIGHT: 262.38 LBS | OXYGEN SATURATION: 96 % | HEART RATE: 78 BPM

## 2021-02-05 DIAGNOSIS — R05.3 CHRONIC COUGH: ICD-10-CM

## 2021-02-05 DIAGNOSIS — J31.0 CHRONIC RHINITIS: ICD-10-CM

## 2021-02-05 DIAGNOSIS — R05.3 CHRONIC COUGH: Primary | ICD-10-CM

## 2021-02-05 LAB
BASOPHILS # BLD AUTO: 0.04 K/UL (ref 0–0.2)
BASOPHILS NFR BLD: 0.9 % (ref 0–1.9)
DIFFERENTIAL METHOD: ABNORMAL
EOSINOPHIL # BLD AUTO: 0.1 K/UL (ref 0–0.5)
EOSINOPHIL NFR BLD: 2 % (ref 0–8)
ERYTHROCYTE [DISTWIDTH] IN BLOOD BY AUTOMATED COUNT: 11.8 % (ref 11.5–14.5)
HCT VFR BLD AUTO: 44.3 % (ref 40–54)
HGB BLD-MCNC: 15.4 G/DL (ref 14–18)
IGE SERPL-ACNC: <35 IU/ML (ref 0–100)
IMM GRANULOCYTES # BLD AUTO: 0.01 K/UL (ref 0–0.04)
IMM GRANULOCYTES NFR BLD AUTO: 0.2 % (ref 0–0.5)
LYMPHOCYTES # BLD AUTO: 1.9 K/UL (ref 1–4.8)
LYMPHOCYTES NFR BLD: 41.5 % (ref 18–48)
MCH RBC QN AUTO: 33.2 PG (ref 27–31)
MCHC RBC AUTO-ENTMCNC: 34.8 G/DL (ref 32–36)
MCV RBC AUTO: 96 FL (ref 82–98)
MONOCYTES # BLD AUTO: 0.4 K/UL (ref 0.3–1)
MONOCYTES NFR BLD: 8.3 % (ref 4–15)
NEUTROPHILS # BLD AUTO: 2.1 K/UL (ref 1.8–7.7)
NEUTROPHILS NFR BLD: 47.1 % (ref 38–73)
NRBC BLD-RTO: 0 /100 WBC
PLATELET # BLD AUTO: 184 K/UL (ref 150–350)
PMV BLD AUTO: 9.5 FL (ref 9.2–12.9)
RBC # BLD AUTO: 4.64 M/UL (ref 4.6–6.2)
WBC # BLD AUTO: 4.46 K/UL (ref 3.9–12.7)

## 2021-02-05 PROCEDURE — 99204 PR OFFICE/OUTPT VISIT, NEW, LEVL IV, 45-59 MIN: ICD-10-PCS | Mod: S$GLB,,, | Performed by: ALLERGY & IMMUNOLOGY

## 2021-02-05 PROCEDURE — 3008F PR BODY MASS INDEX (BMI) DOCUMENTED: ICD-10-PCS | Mod: CPTII,S$GLB,, | Performed by: ALLERGY & IMMUNOLOGY

## 2021-02-05 PROCEDURE — 99999 PR PBB SHADOW E&M-EST. PATIENT-LVL III: CPT | Mod: PBBFAC,,, | Performed by: ALLERGY & IMMUNOLOGY

## 2021-02-05 PROCEDURE — 86003 ALLG SPEC IGE CRUDE XTRC EA: CPT

## 2021-02-05 PROCEDURE — 99204 OFFICE O/P NEW MOD 45 MIN: CPT | Mod: S$GLB,,, | Performed by: ALLERGY & IMMUNOLOGY

## 2021-02-05 PROCEDURE — 99999 PR PBB SHADOW E&M-EST. PATIENT-LVL III: ICD-10-PCS | Mod: PBBFAC,,, | Performed by: ALLERGY & IMMUNOLOGY

## 2021-02-05 PROCEDURE — 82785 ASSAY OF IGE: CPT

## 2021-02-05 PROCEDURE — 3008F BODY MASS INDEX DOCD: CPT | Mod: CPTII,S$GLB,, | Performed by: ALLERGY & IMMUNOLOGY

## 2021-02-05 PROCEDURE — 86003 ALLG SPEC IGE CRUDE XTRC EA: CPT | Mod: 59

## 2021-02-05 PROCEDURE — 36415 COLL VENOUS BLD VENIPUNCTURE: CPT | Mod: PO

## 2021-02-05 PROCEDURE — 85025 COMPLETE CBC W/AUTO DIFF WBC: CPT

## 2021-02-08 LAB
A ALTERNATA IGE QN: <0.1 KU/L
A FUMIGATUS IGE QN: <0.1 KU/L
ALLERGEN CHAETOMIUM GLOBOSUM IGE: <0.1 KU/L
ALLERGEN WALNUT TREE IGE: <0.1 KU/L
ALLERGEN WHITE PINE TREE IGE: <0.1 KU/L
ALMOND IGE QN: <0.1 KU/L
BAHIA GRASS IGE QN: <0.1 KU/L
BALD CYPRESS IGE QN: <0.1 KU/L
BERMUDA GRASS IGE QN: <0.1 KU/L
C HERBARUM IGE QN: <0.1 KU/L
C LUNATA IGE QN: <0.1 KU/L
CASHEW NUT IGE QN: <0.1 KU/L
CAT DANDER IGE QN: <0.1 KU/L
CHAETOMIUM GLOB. CLASS: NORMAL
COMMON RAGWEED IGE QN: <0.1 KU/L
COTTONWOOD IGE QN: <0.1 KU/L
COW MILK IGE QN: <0.1 KU/L
D FARINAE IGE QN: <0.1 KU/L
D PTERONYSS IGE QN: <0.1 KU/L
DEPRECATED A ALTERNATA IGE RAST QL: NORMAL
DEPRECATED A FUMIGATUS IGE RAST QL: NORMAL
DEPRECATED ALMOND IGE RAST QL: NORMAL
DEPRECATED BAHIA GRASS IGE RAST QL: NORMAL
DEPRECATED BALD CYPRESS IGE RAST QL: NORMAL
DEPRECATED BERMUDA GRASS IGE RAST QL: NORMAL
DEPRECATED C HERBARUM IGE RAST QL: NORMAL
DEPRECATED C LUNATA IGE RAST QL: NORMAL
DEPRECATED CASHEW NUT IGE RAST QL: NORMAL
DEPRECATED CAT DANDER IGE RAST QL: NORMAL
DEPRECATED COMMON RAGWEED IGE RAST QL: NORMAL
DEPRECATED COTTONWOOD IGE RAST QL: NORMAL
DEPRECATED COW MILK IGE RAST QL: NORMAL
DEPRECATED D FARINAE IGE RAST QL: NORMAL
DEPRECATED D PTERONYSS IGE RAST QL: NORMAL
DEPRECATED DOG DANDER IGE RAST QL: NORMAL
DEPRECATED EGG WHITE IGE RAST QL: NORMAL
DEPRECATED ELDER IGE RAST QL: NORMAL
DEPRECATED ENGL PLANTAIN IGE RAST QL: NORMAL
DEPRECATED JOHNSON GRASS IGE RAST QL: NORMAL
DEPRECATED LONDON PLANE IGE RAST QL: NORMAL
DEPRECATED MUGWORT IGE RAST QL: NORMAL
DEPRECATED OAT IGE RAST QL: NORMAL
DEPRECATED P NOTATUM IGE RAST QL: NORMAL
DEPRECATED PEANUT IGE RAST QL: NORMAL
DEPRECATED PECAN/HICK NUT IGE RAST QL: NORMAL
DEPRECATED PECAN/HICK TREE IGE RAST QL: NORMAL
DEPRECATED ROACH IGE RAST QL: NORMAL
DEPRECATED S ROSTRATA IGE RAST QL: NORMAL
DEPRECATED SALTWORT IGE RAST QL: NORMAL
DEPRECATED SILVER BIRCH IGE RAST QL: NORMAL
DEPRECATED SOYBEAN IGE RAST QL: NORMAL
DEPRECATED TIMOTHY IGE RAST QL: NORMAL
DEPRECATED WHEAT IGE RAST QL: NORMAL
DEPRECATED WHITE OAK IGE RAST QL: NORMAL
DEPRECATED WILLOW IGE RAST QL: NORMAL
DOG DANDER IGE QN: <0.1 KU/L
EGG WHITE IGE QN: <0.1 KU/L
ELDER IGE QN: <0.1 KU/L
ENGL PLANTAIN IGE QN: <0.1 KU/L
JOHNSON GRASS IGE QN: <0.1 KU/L
LONDON PLANE IGE QN: <0.1 KU/L
MUGWORT IGE QN: <0.1 KU/L
OAT IGE QN: <0.1 KU/L
P NOTATUM IGE QN: <0.1 KU/L
PEANUT IGE QN: <0.1 KU/L
PECAN/HICK NUT IGE QN: <0.1 KU/L
PECAN/HICK TREE IGE QN: <0.1 KU/L
ROACH IGE QN: <0.1 KU/L
S ROSTRATA IGE QN: <0.1 KU/L
SALTWORT IGE QN: <0.1 KU/L
SILVER BIRCH IGE QN: <0.1 KU/L
SOYBEAN IGE QN: <0.1 KU/L
TIMOTHY IGE QN: <0.1 KU/L
WALNUT TREE CLASS: NORMAL
WHEAT IGE QN: <0.1 KU/L
WHITE OAK IGE QN: <0.1 KU/L
WHITE PINE CLASS: NORMAL
WILLOW IGE QN: <0.1 KU/L

## 2021-02-18 ENCOUNTER — PATIENT MESSAGE (OUTPATIENT)
Dept: ALLERGY | Facility: CLINIC | Age: 61
End: 2021-02-18

## 2021-02-18 RX ORDER — ESOMEPRAZOLE MAGNESIUM 40 MG/1
40 CAPSULE, DELAYED RELEASE ORAL
Qty: 30 CAPSULE | Refills: 11 | Status: SHIPPED | OUTPATIENT
Start: 2021-02-18 | End: 2022-02-18

## 2021-04-16 ENCOUNTER — PATIENT MESSAGE (OUTPATIENT)
Dept: RESEARCH | Facility: HOSPITAL | Age: 61
End: 2021-04-16